# Patient Record
Sex: FEMALE | Race: WHITE | Employment: OTHER | URBAN - METROPOLITAN AREA
[De-identification: names, ages, dates, MRNs, and addresses within clinical notes are randomized per-mention and may not be internally consistent; named-entity substitution may affect disease eponyms.]

---

## 2019-05-10 RX ORDER — CEFAZOLIN SODIUM 1 G/3ML
1 INJECTION, POWDER, FOR SOLUTION INTRAMUSCULAR; INTRAVENOUS SEE ADMIN INSTRUCTIONS
Status: CANCELLED | OUTPATIENT
Start: 2019-05-10

## 2019-05-10 RX ORDER — CEFAZOLIN SODIUM 2 G/100ML
2 INJECTION, SOLUTION INTRAVENOUS
Status: CANCELLED | OUTPATIENT
Start: 2019-05-10

## 2019-07-12 RX ORDER — FLUTICASONE PROPIONATE 50 MCG
1 SPRAY, SUSPENSION (ML) NASAL DAILY PRN
COMMUNITY

## 2019-07-12 RX ORDER — LORATADINE 10 MG/1
10 TABLET ORAL DAILY PRN
COMMUNITY

## 2019-07-12 RX ORDER — LEVOTHYROXINE SODIUM 75 UG/1
75 TABLET ORAL EVERY MORNING
COMMUNITY

## 2019-07-12 RX ORDER — MULTIPLE VITAMINS W/ MINERALS TAB 9MG-400MCG
1 TAB ORAL DAILY
Status: ON HOLD | COMMUNITY
End: 2019-07-17

## 2019-07-12 RX ORDER — SIMVASTATIN 10 MG
10 TABLET ORAL AT BEDTIME
COMMUNITY

## 2019-07-12 RX ORDER — ASPIRIN 81 MG/1
81 TABLET ORAL DAILY
Status: ON HOLD | COMMUNITY
End: 2019-07-17

## 2019-07-17 ENCOUNTER — ANESTHESIA (OUTPATIENT)
Dept: SURGERY | Facility: CLINIC | Age: 74
End: 2019-07-17
Payer: MEDICARE

## 2019-07-17 ENCOUNTER — HOSPITAL ENCOUNTER (OUTPATIENT)
Facility: CLINIC | Age: 74
Discharge: HOME | End: 2019-07-18
Attending: OBSTETRICS & GYNECOLOGY | Admitting: OBSTETRICS & GYNECOLOGY
Payer: MEDICARE

## 2019-07-17 ENCOUNTER — ANESTHESIA EVENT (OUTPATIENT)
Dept: SURGERY | Facility: CLINIC | Age: 74
End: 2019-07-17
Payer: MEDICARE

## 2019-07-17 ENCOUNTER — SURGERY (OUTPATIENT)
Age: 74
End: 2019-07-17
Payer: MEDICARE

## 2019-07-17 DIAGNOSIS — N81.4 CYSTOCELE WITH PROLAPSE: Primary | ICD-10-CM

## 2019-07-17 LAB
ABO + RH BLD: NORMAL
ABO + RH BLD: NORMAL
BLD GP AB SCN SERPL QL: NORMAL
BLOOD BANK CMNT PATIENT-IMP: NORMAL
SPECIMEN EXP DATE BLD: NORMAL

## 2019-07-17 PROCEDURE — 37000008 ZZH ANESTHESIA TECHNICAL FEE, 1ST 30 MIN: Performed by: OBSTETRICS & GYNECOLOGY

## 2019-07-17 PROCEDURE — 88307 TISSUE EXAM BY PATHOLOGIST: CPT | Mod: 26 | Performed by: OBSTETRICS & GYNECOLOGY

## 2019-07-17 PROCEDURE — 71000013 ZZH RECOVERY PHASE 1 LEVEL 1 EA ADDTL HR: Performed by: OBSTETRICS & GYNECOLOGY

## 2019-07-17 PROCEDURE — 71000012 ZZH RECOVERY PHASE 1 LEVEL 1 FIRST HR: Performed by: OBSTETRICS & GYNECOLOGY

## 2019-07-17 PROCEDURE — 25000132 ZZH RX MED GY IP 250 OP 250 PS 637: Mod: GY | Performed by: ANESTHESIOLOGY

## 2019-07-17 PROCEDURE — 37000009 ZZH ANESTHESIA TECHNICAL FEE, EACH ADDTL 15 MIN: Performed by: OBSTETRICS & GYNECOLOGY

## 2019-07-17 PROCEDURE — 27210794 ZZH OR GENERAL SUPPLY STERILE: Performed by: OBSTETRICS & GYNECOLOGY

## 2019-07-17 PROCEDURE — 25000128 H RX IP 250 OP 636: Performed by: NURSE ANESTHETIST, CERTIFIED REGISTERED

## 2019-07-17 PROCEDURE — 88307 TISSUE EXAM BY PATHOLOGIST: CPT | Performed by: OBSTETRICS & GYNECOLOGY

## 2019-07-17 PROCEDURE — 25800030 ZZH RX IP 258 OP 636: Performed by: NURSE ANESTHETIST, CERTIFIED REGISTERED

## 2019-07-17 PROCEDURE — 25800030 ZZH RX IP 258 OP 636: Performed by: PHYSICIAN ASSISTANT

## 2019-07-17 PROCEDURE — 25000566 ZZH SEVOFLURANE, EA 15 MIN: Performed by: OBSTETRICS & GYNECOLOGY

## 2019-07-17 PROCEDURE — 25000128 H RX IP 250 OP 636: Performed by: PHYSICIAN ASSISTANT

## 2019-07-17 PROCEDURE — 36000087 ZZH SURGERY LEVEL 8 EA 15 ADDTL MIN: Performed by: OBSTETRICS & GYNECOLOGY

## 2019-07-17 PROCEDURE — 86901 BLOOD TYPING SEROLOGIC RH(D): CPT | Performed by: OBSTETRICS & GYNECOLOGY

## 2019-07-17 PROCEDURE — 36415 COLL VENOUS BLD VENIPUNCTURE: CPT | Performed by: OBSTETRICS & GYNECOLOGY

## 2019-07-17 PROCEDURE — 25000132 ZZH RX MED GY IP 250 OP 250 PS 637: Mod: GY | Performed by: UROLOGY

## 2019-07-17 PROCEDURE — 25000128 H RX IP 250 OP 636: Performed by: OBSTETRICS & GYNECOLOGY

## 2019-07-17 PROCEDURE — 40000170 ZZH STATISTIC PRE-PROCEDURE ASSESSMENT II: Performed by: OBSTETRICS & GYNECOLOGY

## 2019-07-17 PROCEDURE — 25000125 ZZHC RX 250: Performed by: NURSE ANESTHETIST, CERTIFIED REGISTERED

## 2019-07-17 PROCEDURE — 40000935 ZZH STATISTIC OUTPATIENT (NON-OBS) EVE

## 2019-07-17 PROCEDURE — 25000128 H RX IP 250 OP 636: Performed by: ANESTHESIOLOGY

## 2019-07-17 PROCEDURE — 40000936 ZZH STATISTIC OUTPATIENT (NON-OBS) NIGHT

## 2019-07-17 PROCEDURE — 86900 BLOOD TYPING SEROLOGIC ABO: CPT | Performed by: OBSTETRICS & GYNECOLOGY

## 2019-07-17 PROCEDURE — 25000125 ZZHC RX 250: Performed by: OBSTETRICS & GYNECOLOGY

## 2019-07-17 PROCEDURE — C1781 MESH (IMPLANTABLE): HCPCS | Performed by: OBSTETRICS & GYNECOLOGY

## 2019-07-17 PROCEDURE — 25800025 ZZH RX 258: Performed by: OBSTETRICS & GYNECOLOGY

## 2019-07-17 PROCEDURE — 86850 RBC ANTIBODY SCREEN: CPT | Performed by: OBSTETRICS & GYNECOLOGY

## 2019-07-17 PROCEDURE — 36000085 ZZH SURGERY LEVEL 8 1ST 30 MIN: Performed by: OBSTETRICS & GYNECOLOGY

## 2019-07-17 DEVICE — MESH SLING Y SHAPE RESTORELLE 24X4CM 501420: Type: IMPLANTABLE DEVICE | Site: PELVIS | Status: FUNCTIONAL

## 2019-07-17 RX ORDER — PROPOFOL 10 MG/ML
INJECTION, EMULSION INTRAVENOUS PRN
Status: DISCONTINUED | OUTPATIENT
Start: 2019-07-17 | End: 2019-07-17

## 2019-07-17 RX ORDER — DEXAMETHASONE SODIUM PHOSPHATE 4 MG/ML
INJECTION, SOLUTION INTRA-ARTICULAR; INTRALESIONAL; INTRAMUSCULAR; INTRAVENOUS; SOFT TISSUE PRN
Status: DISCONTINUED | OUTPATIENT
Start: 2019-07-17 | End: 2019-07-17

## 2019-07-17 RX ORDER — CIPROFLOXACIN 2 MG/ML
400 INJECTION, SOLUTION INTRAVENOUS EVERY 12 HOURS
Status: DISCONTINUED | OUTPATIENT
Start: 2019-07-18 | End: 2019-07-18 | Stop reason: HOSPADM

## 2019-07-17 RX ORDER — FENTANYL CITRATE 50 UG/ML
25-50 INJECTION, SOLUTION INTRAMUSCULAR; INTRAVENOUS
Status: DISCONTINUED | OUTPATIENT
Start: 2019-07-17 | End: 2019-07-17 | Stop reason: HOSPADM

## 2019-07-17 RX ORDER — ASPIRIN 81 MG/1
81 TABLET ORAL DAILY
Qty: 30 TABLET | Refills: 0 | COMMUNITY
Start: 2019-07-24

## 2019-07-17 RX ORDER — NEOSTIGMINE METHYLSULFATE 1 MG/ML
VIAL (ML) INJECTION PRN
Status: DISCONTINUED | OUTPATIENT
Start: 2019-07-17 | End: 2019-07-17

## 2019-07-17 RX ORDER — NALOXONE HYDROCHLORIDE 0.4 MG/ML
.1-.4 INJECTION, SOLUTION INTRAMUSCULAR; INTRAVENOUS; SUBCUTANEOUS
Status: DISCONTINUED | OUTPATIENT
Start: 2019-07-17 | End: 2019-07-18 | Stop reason: HOSPADM

## 2019-07-17 RX ORDER — HYDROMORPHONE HYDROCHLORIDE 1 MG/ML
.3-.5 INJECTION, SOLUTION INTRAMUSCULAR; INTRAVENOUS; SUBCUTANEOUS EVERY 5 MIN PRN
Status: DISCONTINUED | OUTPATIENT
Start: 2019-07-17 | End: 2019-07-17 | Stop reason: HOSPADM

## 2019-07-17 RX ORDER — PHENAZOPYRIDINE HYDROCHLORIDE 100 MG/1
100 TABLET, FILM COATED ORAL
Status: DISCONTINUED | OUTPATIENT
Start: 2019-07-17 | End: 2019-07-17

## 2019-07-17 RX ORDER — PROPOFOL 10 MG/ML
INJECTION, EMULSION INTRAVENOUS CONTINUOUS PRN
Status: DISCONTINUED | OUTPATIENT
Start: 2019-07-17 | End: 2019-07-17

## 2019-07-17 RX ORDER — DIPHENHYDRAMINE HYDROCHLORIDE 50 MG/ML
12.5 INJECTION INTRAMUSCULAR; INTRAVENOUS EVERY 6 HOURS PRN
Status: DISCONTINUED | OUTPATIENT
Start: 2019-07-17 | End: 2019-07-18 | Stop reason: HOSPADM

## 2019-07-17 RX ORDER — ESTRADIOL 0.1 MG/G
2 CREAM VAGINAL AT BEDTIME
Qty: 42.5 G | Refills: 0 | Status: SHIPPED | OUTPATIENT
Start: 2019-07-17

## 2019-07-17 RX ORDER — LIDOCAINE HYDROCHLORIDE 20 MG/ML
INJECTION, SOLUTION INFILTRATION; PERINEURAL PRN
Status: DISCONTINUED | OUTPATIENT
Start: 2019-07-17 | End: 2019-07-17

## 2019-07-17 RX ORDER — DIPHENHYDRAMINE HCL 12.5MG/5ML
12.5 LIQUID (ML) ORAL EVERY 6 HOURS PRN
Status: DISCONTINUED | OUTPATIENT
Start: 2019-07-17 | End: 2019-07-18 | Stop reason: HOSPADM

## 2019-07-17 RX ORDER — ACETAMINOPHEN 500 MG
1000 TABLET ORAL ONCE
Status: COMPLETED | OUTPATIENT
Start: 2019-07-17 | End: 2019-07-17

## 2019-07-17 RX ORDER — ONDANSETRON 2 MG/ML
4 INJECTION INTRAMUSCULAR; INTRAVENOUS EVERY 30 MIN PRN
Status: DISCONTINUED | OUTPATIENT
Start: 2019-07-17 | End: 2019-07-17 | Stop reason: HOSPADM

## 2019-07-17 RX ORDER — SODIUM CHLORIDE, SODIUM LACTATE, POTASSIUM CHLORIDE, CALCIUM CHLORIDE 600; 310; 30; 20 MG/100ML; MG/100ML; MG/100ML; MG/100ML
INJECTION, SOLUTION INTRAVENOUS CONTINUOUS
Status: DISCONTINUED | OUTPATIENT
Start: 2019-07-17 | End: 2019-07-18 | Stop reason: HOSPADM

## 2019-07-17 RX ORDER — PHENAZOPYRIDINE HYDROCHLORIDE 200 MG/1
200 TABLET, FILM COATED ORAL
Status: DISCONTINUED | OUTPATIENT
Start: 2019-07-17 | End: 2019-07-17 | Stop reason: HOSPADM

## 2019-07-17 RX ORDER — HYDROCODONE BITARTRATE AND ACETAMINOPHEN 5; 325 MG/1; MG/1
1-2 TABLET ORAL EVERY 4 HOURS PRN
Status: DISCONTINUED | OUTPATIENT
Start: 2019-07-17 | End: 2019-07-18 | Stop reason: HOSPADM

## 2019-07-17 RX ORDER — SODIUM CHLORIDE, SODIUM LACTATE, POTASSIUM CHLORIDE, CALCIUM CHLORIDE 600; 310; 30; 20 MG/100ML; MG/100ML; MG/100ML; MG/100ML
INJECTION, SOLUTION INTRAVENOUS CONTINUOUS
Status: DISCONTINUED | OUTPATIENT
Start: 2019-07-17 | End: 2019-07-17 | Stop reason: HOSPADM

## 2019-07-17 RX ORDER — LEVOTHYROXINE SODIUM 75 UG/1
75 TABLET ORAL
Status: DISCONTINUED | OUTPATIENT
Start: 2019-07-18 | End: 2019-07-18 | Stop reason: HOSPADM

## 2019-07-17 RX ORDER — PROCHLORPERAZINE MALEATE 5 MG
5 TABLET ORAL EVERY 6 HOURS PRN
Status: DISCONTINUED | OUTPATIENT
Start: 2019-07-17 | End: 2019-07-18 | Stop reason: HOSPADM

## 2019-07-17 RX ORDER — FENTANYL CITRATE 50 UG/ML
INJECTION, SOLUTION INTRAMUSCULAR; INTRAVENOUS PRN
Status: DISCONTINUED | OUTPATIENT
Start: 2019-07-17 | End: 2019-07-17

## 2019-07-17 RX ORDER — ACETAMINOPHEN 325 MG/1
650 TABLET ORAL EVERY 6 HOURS PRN
Status: DISCONTINUED | OUTPATIENT
Start: 2019-07-17 | End: 2019-07-18 | Stop reason: HOSPADM

## 2019-07-17 RX ORDER — SODIUM CHLORIDE, SODIUM LACTATE, POTASSIUM CHLORIDE, CALCIUM CHLORIDE 600; 310; 30; 20 MG/100ML; MG/100ML; MG/100ML; MG/100ML
INJECTION, SOLUTION INTRAVENOUS CONTINUOUS PRN
Status: DISCONTINUED | OUTPATIENT
Start: 2019-07-17 | End: 2019-07-17

## 2019-07-17 RX ORDER — BUPIVACAINE HYDROCHLORIDE 2.5 MG/ML
INJECTION, SOLUTION EPIDURAL; INFILTRATION; INTRACAUDAL PRN
Status: DISCONTINUED | OUTPATIENT
Start: 2019-07-17 | End: 2019-07-17 | Stop reason: HOSPADM

## 2019-07-17 RX ORDER — CEFAZOLIN SODIUM 1 G/3ML
1 INJECTION, POWDER, FOR SOLUTION INTRAMUSCULAR; INTRAVENOUS SEE ADMIN INSTRUCTIONS
Status: DISCONTINUED | OUTPATIENT
Start: 2019-07-17 | End: 2019-07-17 | Stop reason: HOSPADM

## 2019-07-17 RX ORDER — GLYCOPYRROLATE 0.2 MG/ML
INJECTION, SOLUTION INTRAMUSCULAR; INTRAVENOUS PRN
Status: DISCONTINUED | OUTPATIENT
Start: 2019-07-17 | End: 2019-07-17

## 2019-07-17 RX ORDER — HYDROCODONE BITARTRATE AND ACETAMINOPHEN 5; 325 MG/1; MG/1
1-2 TABLET ORAL EVERY 4 HOURS PRN
Qty: 25 TABLET | Refills: 0 | Status: SHIPPED | OUTPATIENT
Start: 2019-07-17

## 2019-07-17 RX ORDER — CEFAZOLIN SODIUM 2 G/100ML
2 INJECTION, SOLUTION INTRAVENOUS
Status: DISCONTINUED | OUTPATIENT
Start: 2019-07-17 | End: 2019-07-17 | Stop reason: HOSPADM

## 2019-07-17 RX ORDER — NALOXONE HYDROCHLORIDE 0.4 MG/ML
.1-.4 INJECTION, SOLUTION INTRAMUSCULAR; INTRAVENOUS; SUBCUTANEOUS
Status: DISCONTINUED | OUTPATIENT
Start: 2019-07-17 | End: 2019-07-17

## 2019-07-17 RX ORDER — CEFAZOLIN SODIUM 1 G/3ML
1 INJECTION, POWDER, FOR SOLUTION INTRAMUSCULAR; INTRAVENOUS EVERY 8 HOURS
Status: COMPLETED | OUTPATIENT
Start: 2019-07-17 | End: 2019-07-18

## 2019-07-17 RX ORDER — CIPROFLOXACIN 500 MG/1
500 TABLET, FILM COATED ORAL 2 TIMES DAILY
Qty: 10 TABLET | Refills: 0 | Status: SHIPPED | OUTPATIENT
Start: 2019-07-17 | End: 2019-07-22

## 2019-07-17 RX ORDER — ASPIRIN 81 MG
100 TABLET, DELAYED RELEASE (ENTERIC COATED) ORAL DAILY
Qty: 60 TABLET | Refills: 1 | Status: SHIPPED | OUTPATIENT
Start: 2019-07-17

## 2019-07-17 RX ORDER — ONDANSETRON 4 MG/1
4 TABLET, ORALLY DISINTEGRATING ORAL EVERY 30 MIN PRN
Status: DISCONTINUED | OUTPATIENT
Start: 2019-07-17 | End: 2019-07-17 | Stop reason: HOSPADM

## 2019-07-17 RX ORDER — ONDANSETRON 2 MG/ML
INJECTION INTRAMUSCULAR; INTRAVENOUS PRN
Status: DISCONTINUED | OUTPATIENT
Start: 2019-07-17 | End: 2019-07-17

## 2019-07-17 RX ORDER — ONDANSETRON 4 MG/1
4 TABLET, ORALLY DISINTEGRATING ORAL EVERY 6 HOURS PRN
Status: DISCONTINUED | OUTPATIENT
Start: 2019-07-17 | End: 2019-07-18 | Stop reason: HOSPADM

## 2019-07-17 RX ORDER — HYDROMORPHONE HYDROCHLORIDE 1 MG/ML
0.2 INJECTION, SOLUTION INTRAMUSCULAR; INTRAVENOUS; SUBCUTANEOUS
Status: DISCONTINUED | OUTPATIENT
Start: 2019-07-17 | End: 2019-07-18 | Stop reason: HOSPADM

## 2019-07-17 RX ORDER — ONDANSETRON 2 MG/ML
4 INJECTION INTRAMUSCULAR; INTRAVENOUS EVERY 6 HOURS PRN
Status: DISCONTINUED | OUTPATIENT
Start: 2019-07-17 | End: 2019-07-18 | Stop reason: HOSPADM

## 2019-07-17 RX ORDER — MAGNESIUM HYDROXIDE 1200 MG/15ML
LIQUID ORAL PRN
Status: DISCONTINUED | OUTPATIENT
Start: 2019-07-17 | End: 2019-07-17 | Stop reason: HOSPADM

## 2019-07-17 RX ORDER — CEFAZOLIN SODIUM 2 G/100ML
2 INJECTION, SOLUTION INTRAVENOUS
Status: DISCONTINUED | OUTPATIENT
Start: 2019-07-17 | End: 2019-07-17

## 2019-07-17 RX ADMIN — ACETAMINOPHEN 1000 MG: 500 TABLET, FILM COATED ORAL at 11:56

## 2019-07-17 RX ADMIN — CEFAZOLIN SODIUM 2 G: 2 INJECTION, SOLUTION INTRAVENOUS at 12:35

## 2019-07-17 RX ADMIN — PROPOFOL 175 MCG/KG/MIN: 10 INJECTION, EMULSION INTRAVENOUS at 12:24

## 2019-07-17 RX ADMIN — ROCURONIUM BROMIDE 10 MG: 10 INJECTION INTRAVENOUS at 14:30

## 2019-07-17 RX ADMIN — PHENYLEPHRINE HYDROCHLORIDE 50 MCG: 10 INJECTION INTRAVENOUS at 12:40

## 2019-07-17 RX ADMIN — LIDOCAINE HYDROCHLORIDE 80 MG: 20 INJECTION, SOLUTION INFILTRATION; PERINEURAL at 12:24

## 2019-07-17 RX ADMIN — FENTANYL CITRATE 50 MCG: 50 INJECTION INTRAMUSCULAR; INTRAVENOUS at 16:14

## 2019-07-17 RX ADMIN — PHENYLEPHRINE HYDROCHLORIDE 50 MCG: 10 INJECTION INTRAVENOUS at 12:37

## 2019-07-17 RX ADMIN — DEXAMETHASONE SODIUM PHOSPHATE 4 MG: 4 INJECTION, SOLUTION INTRA-ARTICULAR; INTRALESIONAL; INTRAMUSCULAR; INTRAVENOUS; SOFT TISSUE at 12:35

## 2019-07-17 RX ADMIN — SODIUM CHLORIDE 1000 ML: 900 IRRIGANT IRRIGATION at 12:54

## 2019-07-17 RX ADMIN — ONDANSETRON 4 MG: 2 INJECTION INTRAMUSCULAR; INTRAVENOUS at 12:35

## 2019-07-17 RX ADMIN — SODIUM CHLORIDE, POTASSIUM CHLORIDE, SODIUM LACTATE AND CALCIUM CHLORIDE: 600; 310; 30; 20 INJECTION, SOLUTION INTRAVENOUS at 12:30

## 2019-07-17 RX ADMIN — ROCURONIUM BROMIDE 20 MG: 10 INJECTION INTRAVENOUS at 12:45

## 2019-07-17 RX ADMIN — ROCURONIUM BROMIDE 10 MG: 10 INJECTION INTRAVENOUS at 12:35

## 2019-07-17 RX ADMIN — ACETAMINOPHEN 650 MG: 325 TABLET, FILM COATED ORAL at 23:11

## 2019-07-17 RX ADMIN — CEFAZOLIN 1 G: 1 INJECTION, POWDER, FOR SOLUTION INTRAMUSCULAR; INTRAVENOUS at 14:38

## 2019-07-17 RX ADMIN — PROPOFOL: 10 INJECTION, EMULSION INTRAVENOUS at 13:42

## 2019-07-17 RX ADMIN — PHENAZOPYRIDINE HYDROCHLORIDE 200 MG: 200 TABLET ORAL at 11:56

## 2019-07-17 RX ADMIN — HYDROMORPHONE HYDROCHLORIDE 0.5 MG: 1 INJECTION, SOLUTION INTRAMUSCULAR; INTRAVENOUS; SUBCUTANEOUS at 17:21

## 2019-07-17 RX ADMIN — PROPOFOL 130 MG: 10 INJECTION, EMULSION INTRAVENOUS at 12:24

## 2019-07-17 RX ADMIN — MIDAZOLAM 1 MG: 1 INJECTION INTRAMUSCULAR; INTRAVENOUS at 12:24

## 2019-07-17 RX ADMIN — GLYCOPYRROLATE 0.1 MG: 0.2 INJECTION, SOLUTION INTRAMUSCULAR; INTRAVENOUS at 13:12

## 2019-07-17 RX ADMIN — BUPIVACAINE HYDROCHLORIDE 30 ML: 2.5 INJECTION, SOLUTION EPIDURAL; INFILTRATION; INTRACAUDAL; PERINEURAL at 15:09

## 2019-07-17 RX ADMIN — ROCURONIUM BROMIDE 20 MG: 10 INJECTION INTRAVENOUS at 13:30

## 2019-07-17 RX ADMIN — FENTANYL CITRATE 50 MCG: 50 INJECTION, SOLUTION INTRAMUSCULAR; INTRAVENOUS at 12:58

## 2019-07-17 RX ADMIN — ROCURONIUM BROMIDE 40 MG: 10 INJECTION INTRAVENOUS at 12:24

## 2019-07-17 RX ADMIN — DEXMEDETOMIDINE HYDROCHLORIDE 0.4 MCG/KG/HR: 100 INJECTION, SOLUTION INTRAVENOUS at 12:24

## 2019-07-17 RX ADMIN — NEOSTIGMINE METHYLSULFATE 4.5 MG: 1 INJECTION, SOLUTION INTRAVENOUS at 15:08

## 2019-07-17 RX ADMIN — SODIUM CHLORIDE, POTASSIUM CHLORIDE, SODIUM LACTATE AND CALCIUM CHLORIDE: 600; 310; 30; 20 INJECTION, SOLUTION INTRAVENOUS at 15:38

## 2019-07-17 RX ADMIN — SODIUM CHLORIDE, POTASSIUM CHLORIDE, SODIUM LACTATE AND CALCIUM CHLORIDE: 600; 310; 30; 20 INJECTION, SOLUTION INTRAVENOUS at 19:30

## 2019-07-17 RX ADMIN — FENTANYL CITRATE 50 MCG: 50 INJECTION, SOLUTION INTRAMUSCULAR; INTRAVENOUS at 12:24

## 2019-07-17 RX ADMIN — GLYCOPYRROLATE 0.9 MG: 0.2 INJECTION, SOLUTION INTRAMUSCULAR; INTRAVENOUS at 15:08

## 2019-07-17 RX ADMIN — PHENYLEPHRINE HYDROCHLORIDE 100 MCG: 10 INJECTION INTRAVENOUS at 13:12

## 2019-07-17 RX ADMIN — FENTANYL CITRATE 50 MCG: 50 INJECTION INTRAMUSCULAR; INTRAVENOUS at 16:28

## 2019-07-17 RX ADMIN — SODIUM CHLORIDE, POTASSIUM CHLORIDE, SODIUM LACTATE AND CALCIUM CHLORIDE: 600; 310; 30; 20 INJECTION, SOLUTION INTRAVENOUS at 12:18

## 2019-07-17 RX ADMIN — PROPOFOL: 10 INJECTION, EMULSION INTRAVENOUS at 14:26

## 2019-07-17 RX ADMIN — CEFAZOLIN 1 G: 1 INJECTION, POWDER, FOR SOLUTION INTRAMUSCULAR; INTRAVENOUS at 21:26

## 2019-07-17 ASSESSMENT — ENCOUNTER SYMPTOMS
SEIZURES: 0
DYSRHYTHMIAS: 1

## 2019-07-17 NOTE — PROGRESS NOTES
Admission medication history interview status for the 7/17/2019  admission is complete. See EPIC admission navigator for prior to admission medications     Medication history source reliability:Good    Medication history interview source(s):Patient    Medication history resources (including written lists, pill bottles, clinic record):None    Primary pharmacy.[facility]    Additional medication history information not noted on PTA med list :None    Time spent in this activity: 45 minutes    Prior to Admission medications    Medication Sig Last Dose Taking? Auth Provider   aspirin 81 MG EC tablet Take 81 mg by mouth daily 6/29/2019 Yes Reported, Patient   Carboxymethylcellulose Sodium (THERATEARS) 0.25 % SOLN Place 1 drop into both eyes 4 times daily (Theratears) 7/16/2019 at pm Yes Reported, Patient   fluticasone (FLONASE) 50 MCG/ACT nasal spray Spray 1 spray into both nostrils daily as needed  7/14/2019 Yes Reported, Patient   levothyroxine (SYNTHROID/LEVOTHROID) 75 MCG tablet Take 75 mcg by mouth every morning  7/17/2019 at 0000 Yes Reported, Patient   loratadine (CLARITIN) 10 MG tablet Take 10 mg by mouth daily as needed for allergies 7/14/2019 Yes Reported, Patient   PSYLLIUM PO Take 1 teaspoonful by mouth every evening as needed for constipation  more than a week at prn Yes Reported, Patient   simvastatin (ZOCOR) 10 MG tablet Take 10 mg by mouth At Bedtime 7/16/2019 at 2130 Yes Reported, Patient

## 2019-07-17 NOTE — BRIEF OP NOTE
[facility]    Brief Operative Note    Pre-operative diagnosis: GENITAL PROLAPSE ; FEMALE STRESS INCONTIENCE  Post-operative diagnosis same  Procedure: Procedure(s):  DAVINCI SUPRACERVICAL HYSTERECTOMY ; BILATERAL  SALPINGO-OOPHORECTOMY (DR. PURDY )  ROBOTIC ASSISTED SACROCOLPOPEXY, POSSIBLE MIDURETHERAL SLING ; CYSTOSCOPY (DR. CANALES)  POSSIBLE RECTOCELE REPAIR (DR. PURDY)  Surgeon: Surgeon(s) and Role:  Panel 1:     * Phil Purdy MD - Primary  Panel 2:     * Keely Canales MD - Primary     * Mady Herman PA-C - Assisting  Panel 3:     * Phil Purdy MD - Primary  Anesthesia: General   Estimated blood loss: 10 ml  Drains: None  Specimens:   ID Type Source Tests Collected by Time Destination   A : Uterus, Bilateral Fallopian tubes, and Ovaries Organ Uterus with Bilateral Ovaries and Fallopian Tubes SURGICAL PATHOLOGY EXAM Phil Purdy MD 7/17/2019  3:03 PM      Findings:   BUS, EG normal.  Grade III cystocoele, gradeII-III uterine and apical prolapse, minimal grade 0-1 rectocele.  Uterus small, normal contour and serosa, Fallopian tubes previoiusly ligated, ovaries atrophic and normal.  Few adhesions of the omentum/Falciform ligament to the anterior abdominal wall just cephalad from the umbilicus.  Normal cystoscopy.  Excellent anatomic result with resolution of large cystocele, apical prolapse and minimal rectocele.  No apparent complications. .  Complications: None.  Implants:    Implant Name Type Inv. Item Serial No.  Lot No. LRB No. Used   MESH SLING Y SHAPE RESTORELLE [id number] Mesh MESH SLING Y SHAPE RESTORELLE [id number]  COLOPLAST [id number] N/A 1

## 2019-07-17 NOTE — INTERVAL H&P NOTE
I met with the patient and her family  ( and daughter) in the pre-induction area and once again discussed the proposed procedure, pros, cons, risks and benefits.  The procedure as consented is a daVinci supracervical hysterectomy, bilateral salpingo-oophorectomy and possible rectocele repair in combination with a daVinci sacrocolpopexy, possible sling procedure and cystoscopy.  The patient is aware that mesh material will be used for this procedure as well as the concerns that have been raised regarding mesh.  All questions were answered.  We anticipate an overnight observational stay with discharge tomorrow afternoon.  All questions were answered.  Consent forms were signed and both site markings on the patient and on the site marking form was completed.

## 2019-07-17 NOTE — BRIEF OP NOTE
[facility] Brief Operative Note    Pre-operative diagnosis: GENITAL PROLAPSE ; FEMALE STRESS INCONTIENCE   Post-operative diagnosis same   Procedure: Procedure(s):  DAVINCI SUPRACERVICAL HYSTERECTOMY ; BILATERAL  SALPINGO-OOPHORECTOMY (DR. CORONA )  ROBOTIC ASSISTED SACROCOLPOPEXY, POSSIBLE MIDURETHERAL SLING ; CYSTOSCOPY (DR. CANALES)  POSSIBLE RECTOCELE REPAIR (DR. CORONA)   Surgeon: Keely Canales MD   Assistants(s): Mady Herman   Estimated blood loss: Less than 10 ml    Specimens: Uterus, elma fallopian tubes and ovaries    Findings: Cystocele is well reduced

## 2019-07-17 NOTE — ANESTHESIA CARE TRANSFER NOTE
Patient: Katerine Shields    Procedure(s):  DAVINCI SUPRACERVICAL HYSTERECTOMY ; BILATERAL  SALPINGO-OOPHORECTOMY (DR. CORONA )  ROBOTIC ASSISTED SACROCOLPOPEXY, POSSIBLE MIDURETHERAL SLING ; CYSTOSCOPY (DR. CANALES)  POSSIBLE RECTOCELE REPAIR (DR. CORONA)    Diagnosis: GENITAL PROLAPSE ; FEMALE STRESS INCONTIENCE  Diagnosis Additional Information: No value filed.    Anesthesia Type:   General, ETT     Note:  Airway :Face Mask  Patient transferred to:PACU  Comments: Neuromuscular blockade reversed after TOF 3/4, spontaneous respirations, adequate tidal volumes, followed commands to voice, oropharynx suctioned with soft flexible catheter, extubated atraumatically, extubated with suction, airway patent after extubation.  Oxygen via facemask at 10 liters per minute to PACU. Oxygen tubing connected to wall O2 in PACU, SpO2, NiBP, and EKG monitors and alarms on and functioning, Vilma Hugger warmer connected to patient gown, report on patient's clinical status given to PACU RN, RN questions answered. Handoff Report: Identifed the Patient, Identified the Reponsible Provider, Reviewed the pertinent medical history, Discussed the surgical course, Reviewed Intra-OP anesthesia mangement and issues during anesthesia, Set expectations for post-procedure period and Allowed opportunity for questions and acknowledgement of understanding      Vitals: (Last set prior to Anesthesia Care Transfer)    CRNA VITALS  7/17/2019 1512 - 7/17/2019 1549      7/17/2019             NIBP:  117/61    NIBP Mean:  86                Electronically Signed By: JENI Weiss CRNA  July 17, 2019  3:49 PM  
yes

## 2019-07-17 NOTE — ANESTHESIA PREPROCEDURE EVALUATION
Anesthesia Pre-Procedure Evaluation    Patient: Mignon Shields   MRN: [id number] : 1945          Preoperative Diagnosis: GENITAL PROLAPSE ; FEMALE STRESS INCONTIENCE    Procedure(s):  XI ROBOTIC ASSISTED SUPRACERVICAL HYSTERECTOMY ; BILATERAL  SALPINGO-OOPHORECTOMY ( CURRAN )  XI ROBOTIC ASSISTED SACROCOLPOPEXY, POSSIBLE MIDURETHERAL SLING ; CYSTOSCOPY ( ALLY)    Past Medical History:   Diagnosis Date     Hyperlipidemia      Thyroid disease      Past Surgical History:   Procedure Laterality Date     DILATION AND CURETTAGE N/A 12/15/2014    Procedure: DILATION AND CURETTAGE;  Surgeon: Coleen Henson MD;  Location: [facility]     GYN SURGERY       HEAD & NECK SURGERY      total thyroidectomy     ORTHOPEDIC SURGERY       TUBAL LIGATION       EKG - SR, LAD, nonspecific T wave    K 3.9  HGB 14.1    Anesthesia Evaluation     . Pt has had prior anesthetic.     No history of anesthetic complications          ROS/MED HX    ENT/Pulmonary:      (-) sleep apnea, recent URI and Other pulmonary disease   Neurologic:      (-) seizures, Neuropathy and migraines   Cardiovascular: Comment: 2013 normal NM stress test    (+) Dyslipidemia, ----. : . . . :. dysrhythmias (hx of SVT(a flutter), ? related to thryoid medications) .       METS/Exercise Tolerance:     Hematologic:  - neg hematologic  ROS       Musculoskeletal:  - neg musculoskeletal ROS       GI/Hepatic:  - neg GI/hepatic ROS      (-) GERD   Renal/Genitourinary: Comment: Stress incontinence/ genital prolapse - ROS Renal section negative       Endo:     (+) thyroid problem (s/p thryoidectomy for thryoid cancer) hypothyroidism, .   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         Other:                          Physical Exam  Normal systems: cardiovascular, pulmonary and  "dental    Airway   Mallampati: II  TM distance: >3 FB  Neck ROM: full  Comment: Micrognathia     Dental     Cardiovascular   Rhythm and rate: regular and normal      Pulmonary    breath sounds clear to auscultation            Lab Results   Component Value Date    WBC 5.3 11/07/2008    HGB 15.2 11/07/2008    HCT 42.8 11/07/2008     11/07/2008     11/07/2008    POTASSIUM 3.5 11/07/2008    CHLORIDE 101 11/07/2008    CO2 29 11/07/2008    BUN 11 11/07/2008    CR 0.78 11/07/2008    GLC 94 11/07/2008    DEVON 9.7 11/07/2008    ALBUMIN 4.4 11/07/2008    PROTTOTAL 7.7 11/07/2008    ALT 31 11/07/2008    AST 36 11/07/2008    ALKPHOS 80 11/07/2008    BILITOTAL 0.7 11/07/2008    TSH <0.03 (L) 11/08/2002    T4 1.21 11/08/2002       Preop Vitals  BP Readings from Last 3 Encounters:   12/15/14 115/59   08/20/10 112/70   11/07/08 110/80    Pulse Readings from Last 3 Encounters:   11/07/08 78      Resp Readings from Last 3 Encounters:   12/15/14 16    SpO2 Readings from Last 3 Encounters:   12/15/14 97%      Temp Readings from Last 1 Encounters:   12/15/14 36.3  C (97.3  F) (Temporal)    Ht Readings from Last 1 Encounters:   12/15/14 1.626 m (5' 4\")      Wt Readings from Last 1 Encounters:   12/15/14 88 kg (194 lb)    Estimated body mass index is 33.3 kg/m  as calculated from the following:    Height as of 12/15/14: 1.626 m (5' 4\").    Weight as of 12/15/14: 88 kg (194 lb).       Anesthesia Plan      History & Physical Review  History and physical reviewed and following examination; no interval change.    ASA Status:  2 .    NPO Status:  > 8 hours    Plan for General and ETT with Propofol induction. Maintenance will be TIVA.    PONV prophylaxis:  Ondansetron (or other 5HT-3) and Dexamethasone or Solumedrol  Additional equipment: Videolaryngoscope Patient concerned about post op nausea      Postoperative Care  Postoperative pain management:  IV analgesics and Oral pain medications.      Consents  Anesthetic plan, risks, " benefits and alternatives discussed with:  Patient..                 Dominic Chaivra MD

## 2019-07-18 VITALS
TEMPERATURE: 97.8 F | HEART RATE: 63 BPM | RESPIRATION RATE: 16 BRPM | OXYGEN SATURATION: 95 % | DIASTOLIC BLOOD PRESSURE: 40 MMHG | SYSTOLIC BLOOD PRESSURE: 94 MMHG

## 2019-07-18 PROCEDURE — 25000128 H RX IP 250 OP 636: Performed by: PHYSICIAN ASSISTANT

## 2019-07-18 PROCEDURE — 25800030 ZZH RX IP 258 OP 636: Performed by: PHYSICIAN ASSISTANT

## 2019-07-18 PROCEDURE — 40000934 ZZH STATISTIC OUTPATIENT (NON-OBS) DAY

## 2019-07-18 PROCEDURE — 25000132 ZZH RX MED GY IP 250 OP 250 PS 637: Mod: GY | Performed by: PHYSICIAN ASSISTANT

## 2019-07-18 PROCEDURE — 25000132 ZZH RX MED GY IP 250 OP 250 PS 637: Mod: GY | Performed by: UROLOGY

## 2019-07-18 RX ADMIN — CEFAZOLIN 1 G: 1 INJECTION, POWDER, FOR SOLUTION INTRAMUSCULAR; INTRAVENOUS at 12:04

## 2019-07-18 RX ADMIN — ACETAMINOPHEN 650 MG: 325 TABLET, FILM COATED ORAL at 05:29

## 2019-07-18 RX ADMIN — SODIUM CHLORIDE, POTASSIUM CHLORIDE, SODIUM LACTATE AND CALCIUM CHLORIDE: 600; 310; 30; 20 INJECTION, SOLUTION INTRAVENOUS at 02:43

## 2019-07-18 RX ADMIN — LEVOTHYROXINE SODIUM 75 MCG: 75 TABLET ORAL at 06:31

## 2019-07-18 RX ADMIN — ACETAMINOPHEN 650 MG: 325 TABLET, FILM COATED ORAL at 12:04

## 2019-07-18 RX ADMIN — CIPROFLOXACIN 400 MG: 2 INJECTION, SOLUTION INTRAVENOUS at 06:30

## 2019-07-18 RX ADMIN — CEFAZOLIN 1 G: 1 INJECTION, POWDER, FOR SOLUTION INTRAMUSCULAR; INTRAVENOUS at 05:24

## 2019-07-18 NOTE — PROGRESS NOTES
Gyn Post-operative Note POD# 1    S:  Patient doing well.  Pain well controlled with Tylenol only.  Ambulating.  Tolerating liquids.   Voiding.  She reports that she may have passed a small amount of gas during the night.     O:   /47 (BP Location: Right arm)   Pulse 63   Temp 98.1  F (36.7  C) (Oral)   Resp 16   SpO2 96%    No intake/output data recorded.  Gen- A&O, NAD  Abd- soft, non-tender, non-distended, no guarding or rebound  Incision(s)- C/D/I  Ext- Non-tender, no edema    Labs:    Hemoglobin   Date Value Ref Range Status   11/07/2008 15.2 11.7 - 15.7 g/dL Final   ]     Pathology pending     A/P:  73 year old POD# 1 s/p daVinci supracervical hysterectomy, BSO in combination with a daVinci sacrocolpopexy and cystoscopy.  Doing well post op with no apparent complications.     1.  Routine post-op cares  2.  Advance cares per routine  3.  Ambulate  4.  Analgesia  5.  The plan of care was discussed with the patient.  She expressed understanding and agreement.  6.  Discharge is planned for later today  7.  RTC to see me in 6 weeks or so.  Indications to call or return were discussed.  Activity and lifting restrictions were discussed.     Phil Purdy  7/18/2019  7:20 AM

## 2019-07-18 NOTE — PROGRESS NOTES
Outpatient in a Bed discharge goals PRIOR TO DISCHARGE     Comments: List all Outpatient in a Bed discharge goals to be met before discharge home:   ~ Patient able to ambulate as they were prior to admission or with assist devices provided by therapies during their stay. MET     ~ Nurse to Notify Provider when Outpatient in a Bed discharge goals have been met and patient is ready for discharge.

## 2019-07-18 NOTE — PLAN OF CARE
Outpatient in a Bed discharge goals PRIOR TO DISCHARGE     Comments: List all Outpatient in a Bed discharge goals to be met before discharge home:   - Patient able to ambulate as they were prior to admission or with assist devices provided by therapies during their stay. MET   - Nurse to Notify Provider when Outpatient in a Bed discharge goals have been met and patient is ready for discharge: Met    A&O. VSS. Pain managed with tylenol. Discussed discharge instructions and medications with pt and pt's spouse, verbalized understanding. Pt discharged to home, with belongings and medications. Transportation provided by spouse

## 2019-07-18 NOTE — PROVIDER NOTIFICATION
MD Notification    Notified Person: MD    Notified Person Name: Dr. Delvalle     Notification Date/Time: 7/17 2142    Notification Interaction: paged     Purpose of Notification: Pt requesting Tylenol     Orders Received: pending     Comments:    No call back after 45 mins.  Re-paged

## 2019-07-18 NOTE — PLAN OF CARE
Pt arrived safely from PACU. A/ox4, awake/alert. Not OOB yet. Clear liquid diet. C/o 2/10 abdominal discomfort, declines intervention. Lap sites x5 covered with liquid bandage, CDI. BP soft, HR 57, sats high 90's on 3L O2. IV fluids infusing. Discharge pending progress.

## 2019-07-18 NOTE — PROGRESS NOTES
Urology Progress NOte     POD # 1 s.p IVANNA/BSO/SAcro done well; voided over night    Abdomen: soft. Non-distended; incisions are looking good. No vag pack.     Plan: voiding trial prior to d.c [place]    Karlie Mustafa MD

## 2019-07-18 NOTE — OP NOTE
Procedure Date: 07/17/2019      PREOPERATIVE DIAGNOSES:  Symptomatic pelvic organ prolapse, cystocele, uterine and apical prolapse, and minimal rectocele.      POSTOPERATIVE DIAGNOSES:  Symptomatic pelvic organ prolapse, cystocele, uterine and apical prolapse, and minimal rectocele.      PROCEDURES:     1.  Examination under anesthesia, da John supracervical hysterectomy, bilateral salpingo-oophorectomy by Dr. hPil Purdy.     2.  Da John sacrocolpopexy and cystoscopy by Dr. Keely Mustafa MD      ANESTHESIA:  General.      BLOOD LOSS:  10 mL.      PREOPERATIVE STATUS:  Mignon Shields is a 73-year-old  woman who is to undergo the above-named procedure for symptomatic pelvic organ prolapse.  The patient was originally seen by me in referral from Dr. Micaela Razo regarding uterovaginal prolapse.  The patient reports that she originally felt pelvic pressure and a bulging as early as the fall of 2018.  The bulge and pelvic pressure seemed to be worse towards the end of the day and if the patient had been on her feet or performing any activity.  The symptoms persisted and when they seemed to be slightly worsened, she consulted with Dr. Micaela Razo from [facility].  Dr. Razo examined the patient and noted a moderate uterovaginal prolapse.  She was counseled at that time regarding options of management and due to the patient's desire to pursue definitive treatment, she was asked to see myself.  I saw the patient originally at the end of 03/2019.  The patient's symptoms were persisting and perhaps slightly worsening.  The patient was not interested in observation alone and she had no interest in the use of a pessary.  She was examined at that time and found to have moderate to moderately severe pelvic organ prolapse.  This consisted of a cystocele; grade 2 to 3, uterine and apical prolapse; a grade 2 to 3, and a minimal rectocele.  The patient stated that she had some occasional urinary  leakage, but this was not a normal occurrence.  She had previously had 3 vaginal deliveries with one baby being in excess of 10 pounds.  The patient was subsequently referred to Dr. Keely Mustafa for evaluation and consideration of a possible combined surgery.  The patient was seen by Dr. Mustafa in mid-May of 2019.  Again, her exam was unchanged with a grade 2 to 3 cystocele with uterine and apical prolapse and a smaller rectocele.  As I had done, Dr. Mustafa discussed minimally invasive prolapse surgery using da John technology.  The patient was interested in proceeding.  She was therefore scheduled for a da John supracervical hysterectomy with bilateral salpingo-oophorectomy and possible rectocele repair by me and a da John sacrocolpopexy, possible mid urethral sling procedure and cystoscopy by Dr. Mustafa.  The patient returned to Dr. Mustafa's office prior to surgery and there was no occult urinary leakage.  For this reason, there was felt to be no indication for a mid urethral sling.  The patient had a preoperative assessment by her primary care provider and she was felt to be a satisfactory candidate for surgery.  Preoperative assessment was normal.  The patient was counseled regarding the pros, cons, risks, benefits, and potential complications of the proposed surgery.  This included but was not limited to the risk of anesthesia, bleeding, transfusion, infection, injury to normal surrounding structures including but not limited to the bowel, bladder, ureters, major blood vessels and nerves.  The patient was aware that mesh material was to be used during this procedure and the FDA warnings regarding this product.  The use of the mesh and how it differed from some other more complicated mesh products was discussed.  The patient expressed understanding and agreement with the plan of care.  She was anxious to proceed.  The day of surgery, the procedure was again discussed the patient's and  family's questions were answered.  Appropriate consent forms were signed and we proceeded.  Site markings were also performed.      OPERATIVE FINDINGS:  At the time of examination under anesthesia, the external genitalia and vagina were normal.  The cervix was visually normal.  The uterus was small, mobile, and of normal contour.  There were no adnexal masses.  Evaluation of the pelvic floor disclosed a grade 2 to 3 cystocele, grade 2 to 3 uterine and apical prolapse, and a smaller grade 0 to 1 rectocele.  At the time of da John laparoscopy, the pelvis was visually normal.  The uterus was small with a normal contour and normal serosa.  The ovaries and tubes were also atrophic and normal.  There had previously been a tubal ligation.  The remainder of the pelvis was normal.  The course and location of both ureters was easily identified.  There were a few adhesions of the omentum and falciform ligament to the anterior abdominal wall just cephalad from the umbilicus.  These were not involved in the surgery and did not require treatment.  The procedure was performed without incident.  There was no evidence of any injury or complication.  The final anatomic result was excellent with resolution of the cystocele and the smaller rectocele.  The cervical stump and vaginal apex were suspended high in the vagina.  The anatomic result was excellent.  No rectocele repair was necessary.  Cystoscopy showed a normal bladder without evidence of injury.  There was Pyridium stained urine entering into the bladder from both ureteral orifices.  The blood loss was minimal at 10 mL.      OPERATIVE PROCEDURE:  Ms. Shields was brought to the operating room at [facility].  She was placed in the supine position, both intravenous and inhalation anesthesia were administered and endotracheal intubation was performed.  Examination under anesthesia was performed with the findings documented above.  The vagina, perineum and abdomen  were then prepped and draped in the usual fashion for combined abdominal and vaginal surgery.  Once the patient was completely prepped and draped, all instruments were readied.  A timeout was then held, during which the patient's name, medical record number and date of birth were reviewed.  Specifically, the surgery to be performed was also reviewed.  This included a da John supracervical hysterectomy and bilateral salpingo-oophorectomy as well as possible rectocele repair in combination with Dr. Mustafa's da John sacrocolpopexy and cystoscopy.  The patient had received 2 grams of Ancef.  Once the timeout was concluded and all members of the operating room staff were in agreement, the surgery began.  I began the surgery vaginally by placing a Jaramillo catheter under sterile conditions.  Dr. Mustafa and her assistant then placed the abdominal ports per routine.  There were 5 ports that were placed, 2 on the right side of the abdomen, 1 supraumbilically and 2 on the left side of the abdomen.  These ports were all placed under direct visualization without evidence of injury to underlying structures.  The right upper quadrant incision port was for the AirSeal device and the assistant port.  The remainder of the ports were 8 mm or less.  Once the ports were all in place, the da Jhon robot was brought into the surgical field.  The da John Xi was then docked in the usual fashion after the table had been brought down to its lowest most position and steep Trendelenburg.  The instruments were then placed in the following fashion.  Through arm #1 was a Cadarie device, through arm #2 was a long bipolar forceps, arm #3 was the laparoscope, and arm #4 was the monopolar scissors.  These instruments were all placed under direct visualization and without injury.  I then took my position at the [employer] console.  Complete and thorough examination of the abdomen and pelvis was then performed with the findings documented above.   Once the anatomic landmarks were all reviewed, and the findings were noted, the surgery began.  The sigmoid colon was brought up from the pelvis and held in place with the Cadarie device, the course of the ureters was identified and confirmed.  The hysterectomy and bilateral salpingo-oophorectomy was then begun.  The infundibulopelvic ligaments were cauterized bilaterally.  Dissection was carried bilaterally along the mesosalpinx to the round ligaments.  The round ligaments were cauterized and cut.  This allowed entrance into the anterior leaf of the broad ligament.  This was incised in a curvilinear fashion to a level above the cervix.  The bladder was dissected away from the cervix and upper vagina.  The remainder of the hysterectomy was continued by cauterizing along the uterus and cutting pedicles.  One of these pedicles included the uterine vascular pedicle.  At all times, the course of the ureter was monitored.  The dissection was carried down on both sides to a level below the internal os.  Then under direct visualization, the specimen was amputated at a level just below the internal os.  This was a supracervical hysterectomy.  The uterine specimen with tubes and ovaries attached was placed into an Endocatch bag.  The Hulka tenaculum, which had been placed vaginally at the beginning of the procedure was identified.  When placed initially, the cervix was grasped using a tenaculum.  The cervical canal was dilated to a size 6 Hegar dilator.  This allowed placement of the Hulka tenaculum with the end of the cannula covered with the tip of a red Yeboah catheter.  This was used for the manipulation of the uterus during the procedure and identification at the time of supracervical hysterectomy.  Once the specimen was amputated.  The Hulka tenaculum was removed and replaced with  a stick sponge.  The endocervical canal was cauterized with the bipolar cautery.  Complete hemostasis was present.  The uterus, tubes and  ovaries were placed into an Endocatch bag and left in the abdomen to be removed at the end of the surgery.  Dr. Mustafa then took her place at the [employer] console.  I rescrubbed and entered the surgical suite and provided retraction and assistance with the vaginal EA sizer.  Dr. Mustafa performed the sacrocolpopexy and I provided assistance.  The sacrocolpopexy was performed without incident.  I then rescrubbed and entered the surgical suite at the patient's bedside.  The da John robot was undocked and brought away from the surgical field.  The supraumbilical incision was extended a short distance both the fascia and the skin.  The specimen was removed through the supraumbilical incision with tubes and ovaries intact.  The supraumbilical incision was then closed using 0 Vicryl on the fascia and complete and thorough closure was achieved.  The stay sutures on the fascia were also tied together.  Excellent closure of the fascia was achieved.  A laparoscope was then inserted to be certain that no bowel had been brought up into the supraumbilical closure.  This was the case, and there was no evidence of any complication.  The laparoscope was removed.  The remaining ports were removed and there was no intra-abdominal bleeding.  The abdominal incisions were closed using subcuticular 4-0 Monocryl as well as with Dermabond.  Dr. Mustafa meanwhile was performing cystoscopy, the bladder was normal.  There was no evidence of any injury to the bladder and there were strong jets of urine entering the bladder from both ureteral orifices.  Both Dr. Mustafa and I examined the patient and the anatomic result from the sacrocolpopexy was excellent.  The cystocele was resolved.  The cervical stump and vaginal apex were suspended high in the vagina, and there was no residual rectocele.  The Jaramillo catheter was left out.  There was no vaginal packing placed.  The patient was then recalled from general anesthesia, extubated, and  brought to the recovery room in excellent condition, having tolerated the procedure well.  A debriefing was held at which time the patient's name, medical record number, and surgery that was performed was reviewed, namely, by Dr. Rashard Corona Examination under anesthesia, da John supracervical hysterectomy and bilateral salpingo-oophorectomy.  By Dr. Keely Mustafa, da John sacrocolpopexy and cystoscopy.  The patient was brought to the recovery room in excellent condition.  We plan an overnight observational stay with discharge planned for tomorrow, postoperative day #1.        SURGICAL SPECIMEN:  Uterus with tubes and ovaries.        The blood loss was 10 mL.         RASHARD CORONA MD             D: 2019   T: 2019   MT: DARINEL      Name:     DIO KC   MRN:      [id number]        Account:        [id number]   :      1945           Procedure Date: 2019      Document: [id number]       cc: [facility]

## 2019-07-18 NOTE — PROGRESS NOTES
Outpatient in a Bed discharge goals PRIOR TO DISCHARGE     Comments: List all Outpatient in a Bed discharge goals to be met before discharge home:   ~ Patient able to ambulate as they were prior to admission or with assist devices provided by therapies during their stay. MET   ~ Nurse to Notify Provider when Outpatient in a Bed discharge goals have been met and patient is ready for discharge.        A&O x4. VSS on room air. Pt refused capnography monitoring around 0200. Pt oxygenation was monitored continuously with continous pulse oximetry. Mild abdominal pain, 2 out of 10, PRN tylenol given. Up with standby assist. Lap sites CDI x5, liquid bandage  Plan is possible discharge home later today.

## 2019-07-18 NOTE — OP NOTE
Procedure Date: 07/17/2019      PREOPERATIVE DIAGNOSES:  Cystocele grade 3, uterine prolapse grade 2.      POSTOPERATIVE DIAGNOSES:  Cystocele grade 3, uterine prolapse grade 2.      PROCEDURES:  Robotically assisted sacrocolpopexy and cystoscopy.      SURGEON:  Keely Mustafa MD      FIRST ASSISTANT:  Mady Herman PA-C.      ESTIMATED BLOOD LOSS:  10 mL      In conjunction with robotically-assisted supracervical hysterectomy and bilateral salpingo-oophorectomy by Dr. Purdy.      FIRST ASSISTANT:  Keely Mustafa MD.      SPECIMENS:  Uterus, but no cervix, bilateral fallopian tubes and ovaries.      INDICATIONS FOR PROCEDURE:  Mignon Shields is a 73-year-old female who presents with a symptomatic pelvic organ prolapse and voiding symptoms and minor urge incontinence.  She had evaluation with urodynamic testing that demonstrated no occult stress incontinence with the prolapse reduced.  She was consented for the procedure with the risks of bleeding, infection, injury, de shai urge incontinence, urinary retention, mesh erosion, dyspareunia.  She also received FDA warning regarding all vaginally placed mesh.  She would like to proceed.      DETAILS OF THE PROCEDURE:  Mignon was brought to the operating room, placed in supine position.  After excellent induction of general anesthesia, her perineum was prepped and draped in a regular fashion.  OG tube, Jaramillo catheter were placed.  Midline incision was made above the umbilicus and peritoneum was insufflated to the pressure of 15.  Additional three 8 mm robotic trocars were placed throughout the abdomen and a 5 mm airport seal was placed in the right abdomen.  The Xi robot was docked in and the case was turned over to Dr. Purdy to proceed with the supracervical hysterectomy, bilateral salpingo-oophorectomy, which I assisted.  Once his part of the procedure was complete, I started by identifying peritoneum on top of the sacral promontory.  Peritoneum was opened  up all the way to the cervix, 2-0 Prolene sutures were placed through anterior longitudinal ligament on top of the sacrum x2.  Dissection was carried over anterior and posterior surfaces of the cervix and vagina.  I positioned polypropylene type 1 mesh from Restorelle kit by [employer] and sutured using 2-0 Ethibond interrupted x6 over anterior surface and interrupted 2-0 Ethibond x5 over posterior surface, after I closed the cervix using a 3-0 PDS figure-of-eight.      I also used interrupted 3-0 PDS over posterior vaginal wall to tack the very distal portion of the graft to the vagina.  That provided excellent support to the graft, attaching to the apex of the vagina.  I adjusted tension-free positioning of the graft and sutured the long portion of the Y mesh to the already preplaced sutures over the sacrum.  All extra amount of the graft was removed and I closed peritoneum on top of the graft using 2-0 Vicryl in a running fashion.  The specimen was delivered through the midline incision and Dr. Purdy to closed the midline incision.  I proceeded with cystoscopy that demonstrated no stitch, no mesh, no pathology in the bladder or urethra.  I was able to see efflux of urine coming from both ureteral orifices.  No vag pack and no Jaramillo were placed.  The plan as of now would be to give her a voiding trial and see her back in 1 month interval.            LEAH CANALES MD             D: 2019   T: 2019   MT: GRISELDA      Name:     DIO KC   MRN:      [id number]        Account:        [id number]   :      1945           Procedure Date: 2019      Document: [id number]       cc: Leah Canales MD

## 2019-07-18 NOTE — DISCHARGE INSTRUCTIONS
Today you were given 975 mg of Tylenol at ***. The recommended daily maximum dose is 4000 mg. While you were at the hospital today you were given a medication called Pyridium.  This is used to treat pain, burning, increased urination, and increased urge to urinate.    Pyridium will most likely darken the color of your urine to an orange or red color. Darkened urine may also cause stains to your underwear, which may or may not be removed by laundering.

## 2019-07-18 NOTE — ANESTHESIA POSTPROCEDURE EVALUATION
Patient: Katerine Shields    Procedure(s):  DAVINCI SUPRACERVICAL HYSTERECTOMY ; BILATERAL  SALPINGO-OOPHORECTOMY (DR. CORONA )  ROBOTIC ASSISTED SACROCOLPOPEXY, POSSIBLE MIDURETHERAL SLING ; CYSTOSCOPY (DR. CANALES)  POSSIBLE RECTOCELE REPAIR (DR. CORONA)    Diagnosis:GENITAL PROLAPSE ; FEMALE STRESS INCONTIENCE  Diagnosis Additional Information: No value filed.    Anesthesia Type:  General, ETT    Note:  Anesthesia Post Evaluation    Patient location during evaluation: PACU  Patient participation: Able to fully participate in evaluation  Level of consciousness: awake  Pain management: adequate  Airway patency: patent  Cardiovascular status: acceptable  Respiratory status: acceptable  Hydration status: acceptable  PONV: none     Anesthetic complications: None          Last vitals:  Vitals:    07/17/19 1715 07/17/19 1800 07/17/19 1842   BP: 105/60 103/60 104/45   Pulse: (!) 49 51 63   Resp: 12 12 16   Temp: 36.4  C (97.5  F) 36.8  C (98.2  F) 35.4  C (95.8  F)   SpO2: 97% 98% 98%         Electronically Signed By: Dominic Chavira MD  July 17, 2019  7:18 PM

## 2019-07-19 LAB — COPATH REPORT: NORMAL

## 2019-09-28 ENCOUNTER — HEALTH MAINTENANCE LETTER (OUTPATIENT)
Age: 74
End: 2019-09-28

## 2020-03-15 ENCOUNTER — HEALTH MAINTENANCE LETTER (OUTPATIENT)
Age: 75
End: 2020-03-15

## 2020-09-10 NOTE — SAFE
Spoke with Dr. Mustafa regarding pt's PVRs and requiring a straight cath. Inquiring if she though patient was still okay to discharge.    Dr. Mustafa stated that she feels okay with patient discharging and will follow up with her early next week   Complex Repair And Dermal Autograft Text: The defect edges were debeveled with a #15 scalpel blade.  The primary defect was closed partially with a complex linear closure.  Given the location of the defect, shape of the defect and the proximity to free margins an dermal autograft was deemed most appropriate to repair the remaining defect.  The graft was trimmed to fit the size of the remaining defect.  The graft was then placed in the primary defect, oriented appropriately, and sutured into place.

## 2021-01-10 ENCOUNTER — HEALTH MAINTENANCE LETTER (OUTPATIENT)
Age: 76
End: 2021-01-10

## 2021-05-08 ENCOUNTER — HEALTH MAINTENANCE LETTER (OUTPATIENT)
Age: 76
End: 2021-05-08

## 2021-10-23 ENCOUNTER — HEALTH MAINTENANCE LETTER (OUTPATIENT)
Age: 76
End: 2021-10-23

## 2022-06-04 ENCOUNTER — HEALTH MAINTENANCE LETTER (OUTPATIENT)
Age: 77
End: 2022-06-04

## 2022-10-09 ENCOUNTER — HEALTH MAINTENANCE LETTER (OUTPATIENT)
Age: 77
End: 2022-10-09

## 2023-06-10 ENCOUNTER — HEALTH MAINTENANCE LETTER (OUTPATIENT)
Age: 78
End: 2023-06-10

## (undated) DEVICE — DILATOR PROBE UTERINE OS CANAL FINDER 260-610

## (undated) DEVICE — SUCTION CANISTER MEDIVAC LINER 3000ML W/LID 65651-530

## (undated) DEVICE — ESU GROUND PAD UNIVERSAL W/O CORD

## (undated) DEVICE — BLADE CLIPPER 4406

## (undated) DEVICE — GRASPER LAPAROSCOPIC EPIX 5MMX35CM C4130

## (undated) DEVICE — GOWN IMPERVIOUS SPECIALTY XL/XLONG 39049

## (undated) DEVICE — TUBING CONMED AIRSEAL SMOKE EVAC INSUFFLATION ASM-EVAC

## (undated) DEVICE — DAVINCI XI DRAPE ARM 470015

## (undated) DEVICE — SU PROLENE 2-0 V-7 36" 8977H

## (undated) DEVICE — TUBING IRRIG TUR Y TYPE 96" LF 6543-01

## (undated) DEVICE — LINEN TOWEL PACK X5 5464

## (undated) DEVICE — LUBRICATING JELLY 4.25OZ

## (undated) DEVICE — PANTIES MESH LG/XLG 2PK 706M2

## (undated) DEVICE — DRAPE IOBAN INCISE 23X17" 6650EZ

## (undated) DEVICE — SU VICRYL 2-0 SH 27" J317H

## (undated) DEVICE — TUBING SUCTION 12"X1/4" N612

## (undated) DEVICE — NDL INSUFFLATION 13GA 120MM C2201

## (undated) DEVICE — DAVINCI XI NDL DRIVER MEGA SUTURE CUT 8MM 470309

## (undated) DEVICE — ESU PENCIL W/HOLSTER E2350H

## (undated) DEVICE — Device

## (undated) DEVICE — ENDO TROCAR CONMED AIRSEAL BLADELESS 05X120MM IAS5-120LP

## (undated) DEVICE — SPONGE PACK VAGINAL 2"X9

## (undated) DEVICE — SU VICRYL 3-0 SH 27" J316H

## (undated) DEVICE — DAVINCI XI SEAL UNIVERSAL 5-8MM 470361

## (undated) DEVICE — GLOVE PROTEXIS W/NEU-THERA 6.5  2D73TE65

## (undated) DEVICE — DAVINCI XI OBTURATOR BLADELESS 8MM 470359

## (undated) DEVICE — GLOVE PROTEXIS BLUE W/NEU-THERA 7.0  2D73EB70

## (undated) DEVICE — DAVINCI HOT SHEARS TIP COVER  400180

## (undated) DEVICE — PACK DAVINCI GYN SMA15GDFS1

## (undated) DEVICE — CATH TRAY FOLEY SURESTEP 16FR WDRAIN BAG STLK LATEX A300316A

## (undated) DEVICE — SU VICRYL 0 UR-6 27" J603H

## (undated) DEVICE — SOL WATER IRRIG 1000ML BOTTLE 2F7114

## (undated) DEVICE — SOL WATER IRRIG 3000ML BAG 2B7117

## (undated) DEVICE — SU MONOCRYL 4-0 PS-2 18" UND Y496G

## (undated) DEVICE — SU ETHIBOND 2-0 SHDA 30" X563H

## (undated) DEVICE — SUCTION IRR STRYKERFLOW II W/TIP 250-070-520

## (undated) DEVICE — KIT PATIENT POSITIONING PIGAZZI LATEX FREE 40580

## (undated) DEVICE — DAVINCI XI DRAPE COLUMN 470341

## (undated) DEVICE — SYR 10ML FINGER CONTROL W/O NDL 309695

## (undated) DEVICE — SU PDS II 3-0 SH 27" Z316H

## (undated) DEVICE — DAVINCI XI GRASPER ENDOWRIST BIPOLAR LONG 470400

## (undated) DEVICE — DAVINCI XI FCP CADIERE 470049

## (undated) DEVICE — DRAPE MAYO STAND 23X54 8337

## (undated) DEVICE — SOL NACL 0.9% INJ 1000ML BAG 2B1324X

## (undated) RX ORDER — PROPOFOL 10 MG/ML
INJECTION, EMULSION INTRAVENOUS
Status: DISPENSED
Start: 2019-07-17

## (undated) RX ORDER — PHENAZOPYRIDINE HYDROCHLORIDE 200 MG/1
TABLET, FILM COATED ORAL
Status: DISPENSED
Start: 2019-07-17

## (undated) RX ORDER — FENTANYL CITRATE 50 UG/ML
INJECTION, SOLUTION INTRAMUSCULAR; INTRAVENOUS
Status: DISPENSED
Start: 2019-07-17

## (undated) RX ORDER — ACETAMINOPHEN 500 MG
TABLET ORAL
Status: DISPENSED
Start: 2019-07-17

## (undated) RX ORDER — CEFAZOLIN SODIUM 1 G/3ML
INJECTION, POWDER, FOR SOLUTION INTRAMUSCULAR; INTRAVENOUS
Status: DISPENSED
Start: 2019-07-17

## (undated) RX ORDER — HYDROMORPHONE HYDROCHLORIDE 1 MG/ML
INJECTION, SOLUTION INTRAMUSCULAR; INTRAVENOUS; SUBCUTANEOUS
Status: DISPENSED
Start: 2019-07-17

## (undated) RX ORDER — CEFAZOLIN SODIUM 2 G/100ML
INJECTION, SOLUTION INTRAVENOUS
Status: DISPENSED
Start: 2019-07-17

## (undated) RX ORDER — BUPIVACAINE HYDROCHLORIDE 2.5 MG/ML
INJECTION, SOLUTION EPIDURAL; INFILTRATION; INTRACAUDAL
Status: DISPENSED
Start: 2019-07-17

## (undated) RX ORDER — ONDANSETRON 2 MG/ML
INJECTION INTRAMUSCULAR; INTRAVENOUS
Status: DISPENSED
Start: 2019-07-17

## (undated) RX ORDER — DEXAMETHASONE SODIUM PHOSPHATE 4 MG/ML
INJECTION, SOLUTION INTRA-ARTICULAR; INTRALESIONAL; INTRAMUSCULAR; INTRAVENOUS; SOFT TISSUE
Status: DISPENSED
Start: 2019-07-17

## (undated) RX ORDER — LIDOCAINE HYDROCHLORIDE 20 MG/ML
INJECTION, SOLUTION EPIDURAL; INFILTRATION; INTRACAUDAL; PERINEURAL
Status: DISPENSED
Start: 2019-07-17

## (undated) RX ORDER — BUPIVACAINE HYDROCHLORIDE AND EPINEPHRINE 5; 5 MG/ML; UG/ML
INJECTION, SOLUTION EPIDURAL; INTRACAUDAL; PERINEURAL
Status: DISPENSED
Start: 2019-07-17